# Patient Record
(demographics unavailable — no encounter records)

---

## 2025-06-12 NOTE — PHYSICAL EXAM
[Binocular Microscopic Exam] : Binocular microscopic exam was performed [FreeTextEntry8] : obstructing cerumen removed with suction  [FreeTextEntry9] : obstructing cerumen removed with suction  [de-identified] : EOMI/PERRL w/ no resting nystagmus; CN 2-12 intact; good smooth pursuit; normal Hamalgyi head thrust; unremarkable gait; no dysdiadochokinesia  [Normal] : no masses and lesions seen, face is symmetric

## 2025-06-12 NOTE — HISTORY OF PRESENT ILLNESS
[de-identified] : When she stands up from lying she feels dizzy. She also gets motion sick easily. No hearing loss or tinnitus; good language dev. Passed her [facility].

## 2025-06-12 NOTE — ASSESSMENT
[FreeTextEntry1] : I reassured them and we discussed likely orthostatic dizziness; get up slowly, hydrate, and she may liberalize her salt intake if ok with their pediatrician. RTC for worsening.